# Patient Record
(demographics unavailable — no encounter records)

---

## 2024-10-21 NOTE — DISCUSSION/SUMMARY
[FreeTextEntry1] : 28 YEAR OLD FEMALE LMP 10/12/2024 PRESENTS AFTER CALLING 10/15/2024 HAVING FOUND A "GROWTH" BETWEEN VAGINA AND RECTUM.  PT NOTICED IT IN A MIRROR. AREA IS NOT PAINFUL OR UNCOMFORTABLE AND NO COMPLAINTS WITH SEX..  PE: BP 90/58; TEMP 97.0 ;WEIGHT 130 LBS HEIGHT 5'1"        PELVIC; EXTERNAL GENITALIA EXAMINED AND THIN 1-2MM X 3-4 MM SKIN TAG NOTED MIDWAY BETWEEN VAGINAL OPENIING AND RECTUM.    IMP; SKIN TAG  PLAN; ADVISED PT THAT SKIN TAG COULD BE REMOVED IF SO DESIRED BUT DUE TO THE FACT THAT IT IS NOT UNCOMFORTABLE AND DOES NOT INTERFERE WITH ANY ACTIVITIES IN ANY WAY, IT COULD BE LEFT WITHOUT ANY EXCISION. PT ADVISED THAT SHOULD IT CHANGE IN SIZE OR BECOME UNCOMFORTABLE, IT COULD EASILY BE REMOVED IN THE OFFICE. PT WILL NOT DO ANYTHING WITH IT FOR NOW.

## 2024-12-29 NOTE — HISTORY OF PRESENT ILLNESS
[de-identified] : Mrs Causey presents to clinic as an ER referral after being evaluated for acute low back pain in the setting of a gym-related injury. Her pain has improved since the accident, but she continues to have axial back pain limiting her ability to work-out. No red flag symptoms. ER imaging is unremarkable. Will provide referral for PT/pain management and have her follow-up afterwards, MRI lumbar spine if the pain does not improve.  PHYSICAL EXAM: Constitutional: Well appearing, no distress HEENT: Normocephalic Atraumatic Psychiatric: Alert and oriented x 3, normal mood Pulmonary: No respiratory distress   Neurologic: CN II-XII grossly intact Palpation: + no tenderness to SI joint upon palpation Strength: Full strength in all major muscle groups, no atrophy Sensation: Full sensation to light touch in all extremities Reflexes: 2+ patellar 2+ biceps   No Rojo's No Clonus   ROM normal   SLR negative b/l Gait: Normal

## 2024-12-29 NOTE — HISTORY OF PRESENT ILLNESS
[de-identified] : Mrs Causey presents to clinic as an ER referral after being evaluated for acute low back pain in the setting of a gym-related injury. Her pain has improved since the accident, but she continues to have axial back pain limiting her ability to work-out. No red flag symptoms. ER imaging is unremarkable. Will provide referral for PT/pain management and have her follow-up afterwards, MRI lumbar spine if the pain does not improve.  PHYSICAL EXAM: Constitutional: Well appearing, no distress HEENT: Normocephalic Atraumatic Psychiatric: Alert and oriented x 3, normal mood Pulmonary: No respiratory distress   Neurologic: CN II-XII grossly intact Palpation: + no tenderness to SI joint upon palpation Strength: Full strength in all major muscle groups, no atrophy Sensation: Full sensation to light touch in all extremities Reflexes: 2+ patellar 2+ biceps   No Rojo's No Clonus   ROM normal   SLR negative b/l Gait: Normal

## 2025-07-21 NOTE — DISCUSSION/SUMMARY
[FreeTextEntry1] : 29 YEAR OLD FEMALE LMP 7/9/2025 WITH CYCLES MONTHLY X 5 BUT RECENTLY CYCLES WITH LONGER INTERVALS UP TO 33 DAYS ( USED TO BE 26-28) , PRESENTS FOR WELL WOMAN GYNECOLGOIC EXAMINATION AND PAP .PERIODS NOT PAINFUL RO HEAVY. LAST SEEN FOR WELL WOMAN VISIT AND PAP 7/11/2024. PAP DONE AT THAT TIME WAS NEGATIVE. NO NEW MEDICAL OR SURGICAL HISTORY SINCE LAST VISIT. 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  NO STEADY PARTNER.                PREIVOUS SEXUAL ACTIVITIES WITH CONDOMS AT TIMES AND WITHDRAWAL AT TIMES                POSITIONAL PAIN , ALWAYS , DUE TO RETROVERTED UTERUS BREASTS; DOES BREAST SELF EXAMINATION                    NO FAM HISTORY OF BREAST CANCER +EXERCISE; ; + MULTIVITAMINS; +DAIRY; NO TOBACCO OR VAPING  PE;/66; TEMP  97.0;WEIGHT 135 LBS HEIGHT 5'1"      BREASTS;NO MASSES OR DISCHARGES      ABDOMEN;SOFT, NO MASSES; NO TENDERNESSS TO PALPATION      PELVIC NORMAL EXTERNAL GENITALIA                    NORMAL URETHRAL MEATUS                    NORMAL VAGINA WITHOUT LESION                    NORMAL CERVIX                    3RD DEGREE RETROVERTED NORMAL UTERUS ON BIMANUAL EXAMINATION                     NO PALPABLE ADNEXAL MASSES  IMP; WELL WOMAN         MLD OVERFLOW STRESS  URINARY INCONTINENCE         POSSIBLE STD EXPOSURE  PLAN; THIN PREP PAP WITH ASCUS  REFLEX TO HPV HR TESTING              BREAST SELF EXAMINATOIN MONTHLY CONTINUED TO BE STRONGLY ADVISED             STD CULTURE DONE              BLOOD WORK FOR STDS AND FOR HORMONES ADAVISED AND SCRIPT GIVEN-DAY 21              RTURN TO OFFICE ONE YEAR OR PRN